# Patient Record
Sex: MALE | Race: WHITE
[De-identification: names, ages, dates, MRNs, and addresses within clinical notes are randomized per-mention and may not be internally consistent; named-entity substitution may affect disease eponyms.]

---

## 2019-08-02 ENCOUNTER — HOSPITAL ENCOUNTER (EMERGENCY)
Dept: HOSPITAL 53 - M ED | Age: 64
LOS: 1 days | Discharge: HOME | End: 2019-08-03
Payer: COMMERCIAL

## 2019-08-02 VITALS — BODY MASS INDEX: 29.32 KG/M2 | HEIGHT: 71 IN | WEIGHT: 209.44 LBS

## 2019-08-02 DIAGNOSIS — S60.450A: Primary | ICD-10-CM
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
## 2019-08-02 DIAGNOSIS — Z79.899: ICD-10-CM

## 2019-08-02 DIAGNOSIS — I10: ICD-10-CM

## 2019-08-03 VITALS — SYSTOLIC BLOOD PRESSURE: 150 MMHG | DIASTOLIC BLOOD PRESSURE: 94 MMHG

## 2019-08-03 NOTE — REP
Clinical:  Foreign body.

 

Technique:  AP, lateral, bilateral oblique views of the right second digit.

 

Findings:

Foreign body consistent with fishhook within the soft tissues overlying the

distal phalanx of the second digit.

 

Impression:

Foreign body within the soft tissue overlying the distal phalanx.

 

 

Electronically Signed by

Jf Felix MD 08/03/2019 07:25 A